# Patient Record
Sex: FEMALE | Race: WHITE
[De-identification: names, ages, dates, MRNs, and addresses within clinical notes are randomized per-mention and may not be internally consistent; named-entity substitution may affect disease eponyms.]

---

## 2019-12-20 ENCOUNTER — HOSPITAL ENCOUNTER (EMERGENCY)
Dept: HOSPITAL 54 - ER | Age: 54
Discharge: HOME | End: 2019-12-20
Payer: COMMERCIAL

## 2019-12-20 VITALS — SYSTOLIC BLOOD PRESSURE: 115 MMHG | DIASTOLIC BLOOD PRESSURE: 57 MMHG

## 2019-12-20 VITALS — WEIGHT: 130 LBS | HEIGHT: 64 IN | BODY MASS INDEX: 22.2 KG/M2

## 2019-12-20 DIAGNOSIS — K62.3: Primary | ICD-10-CM

## 2019-12-20 DIAGNOSIS — R94.31: ICD-10-CM

## 2019-12-20 DIAGNOSIS — Z60.2: ICD-10-CM

## 2019-12-20 DIAGNOSIS — F17.200: ICD-10-CM

## 2019-12-20 LAB
BASOPHILS # BLD AUTO: 0 /CMM (ref 0–0.2)
BASOPHILS NFR BLD AUTO: 0.7 % (ref 0–2)
BUN SERPL-MCNC: 16 MG/DL (ref 7–18)
CALCIUM SERPL-MCNC: 9.1 MG/DL (ref 8.5–10.1)
CHLORIDE SERPL-SCNC: 106 MMOL/L (ref 98–107)
CO2 SERPL-SCNC: 28 MMOL/L (ref 21–32)
CREAT SERPL-MCNC: 0.8 MG/DL (ref 0.6–1.3)
EOSINOPHIL NFR BLD AUTO: 1.2 % (ref 0–6)
GLUCOSE SERPL-MCNC: 124 MG/DL (ref 74–106)
HCT VFR BLD AUTO: 39 % (ref 33–45)
HGB BLD-MCNC: 13.2 G/DL (ref 11.5–14.8)
LYMPHOCYTES NFR BLD AUTO: 1.9 /CMM (ref 0.8–4.8)
LYMPHOCYTES NFR BLD AUTO: 36.9 % (ref 20–44)
MCHC RBC AUTO-ENTMCNC: 34 G/DL (ref 31–36)
MCV RBC AUTO: 92 FL (ref 82–100)
MONOCYTES NFR BLD AUTO: 0.7 /CMM (ref 0.1–1.3)
MONOCYTES NFR BLD AUTO: 14.3 % (ref 2–12)
NEUTROPHILS # BLD AUTO: 2.4 /CMM (ref 1.8–8.9)
NEUTROPHILS NFR BLD AUTO: 46.9 % (ref 43–81)
PLATELET # BLD AUTO: 271 /CMM (ref 150–450)
POTASSIUM SERPL-SCNC: 3.6 MMOL/L (ref 3.5–5.1)
RBC # BLD AUTO: 4.28 MIL/UL (ref 4–5.2)
SODIUM SERPL-SCNC: 142 MMOL/L (ref 136–145)
WBC NRBC COR # BLD AUTO: 5.1 K/UL (ref 4.3–11)

## 2019-12-20 PROCEDURE — 99406 BEHAV CHNG SMOKING 3-10 MIN: CPT

## 2019-12-20 PROCEDURE — 96374 THER/PROPH/DIAG INJ IV PUSH: CPT

## 2019-12-20 PROCEDURE — 85730 THROMBOPLASTIN TIME PARTIAL: CPT

## 2019-12-20 PROCEDURE — 71045 X-RAY EXAM CHEST 1 VIEW: CPT

## 2019-12-20 PROCEDURE — 99284 EMERGENCY DEPT VISIT MOD MDM: CPT

## 2019-12-20 PROCEDURE — 93005 ELECTROCARDIOGRAM TRACING: CPT

## 2019-12-20 PROCEDURE — 36415 COLL VENOUS BLD VENIPUNCTURE: CPT

## 2019-12-20 PROCEDURE — 85025 COMPLETE CBC W/AUTO DIFF WBC: CPT

## 2019-12-20 PROCEDURE — 96375 TX/PRO/DX INJ NEW DRUG ADDON: CPT

## 2019-12-20 PROCEDURE — 80048 BASIC METABOLIC PNL TOTAL CA: CPT

## 2019-12-20 SDOH — SOCIAL STABILITY - SOCIAL INSECURITY: PROBLEMS RELATED TO LIVING ALONE: Z60.2

## 2019-12-20 NOTE — NUR
PATIENT A/OX4, AMBULATORY WITH STEADY GAIT. IV removed. Catheter intact and 
site benign. Pressure and 4x4 applied to site. No bleeding noted.Patient 
discharged to home in stable condition. Written and verbal after care 
instructions given. Patient verbalizes understanding of instruction.

## 2019-12-20 NOTE — NUR
BIB SELF C/O RECTAL PAIN, "I CAN'T PUT BACK MY HEMORRHOID" PATIENT A/OX4, 
BREATHING EVEN AND UNLABORED, NO SOB NOTED, NEEDS ATTENDED, C/O SEVERE PAIN. 
CHANGED INTO GOWN, ATTACHED TO THE CARDIAC MONITOR.